# Patient Record
Sex: MALE | Race: WHITE | Employment: FULL TIME | URBAN - NONMETROPOLITAN AREA
[De-identification: names, ages, dates, MRNs, and addresses within clinical notes are randomized per-mention and may not be internally consistent; named-entity substitution may affect disease eponyms.]

---

## 2020-04-08 RX ORDER — METOPROLOL TARTRATE 50 MG/1
50 TABLET, FILM COATED ORAL 2 TIMES DAILY
COMMUNITY

## 2020-04-08 RX ORDER — LISINOPRIL 20 MG/1
20 TABLET ORAL DAILY
COMMUNITY

## 2020-04-09 ENCOUNTER — OFFICE VISIT (OUTPATIENT)
Dept: NEUROLOGY | Age: 55
End: 2020-04-09
Payer: COMMERCIAL

## 2020-04-09 VITALS
HEIGHT: 71 IN | HEART RATE: 84 BPM | SYSTOLIC BLOOD PRESSURE: 150 MMHG | WEIGHT: 232 LBS | BODY MASS INDEX: 32.48 KG/M2 | DIASTOLIC BLOOD PRESSURE: 80 MMHG | RESPIRATION RATE: 16 BRPM

## 2020-04-09 PROBLEM — R26.89 BALANCE PROBLEM: Status: ACTIVE | Noted: 2020-04-09

## 2020-04-09 PROBLEM — F10.10 ALCOHOL ABUSE: Status: ACTIVE | Noted: 2020-04-09

## 2020-04-09 PROBLEM — I10 ESSENTIAL HYPERTENSION: Status: ACTIVE | Noted: 2020-04-09

## 2020-04-09 PROBLEM — R53.1 WEAKNESS: Status: ACTIVE | Noted: 2020-04-09

## 2020-04-09 PROBLEM — F17.200 SMOKER: Status: ACTIVE | Noted: 2020-04-09

## 2020-04-09 PROBLEM — H57.9 VISUAL SYMPTOMS: Status: ACTIVE | Noted: 2020-04-09

## 2020-04-09 PROBLEM — I67.82 CEREBRAL ISCHEMIA: Status: ACTIVE | Noted: 2020-04-09

## 2020-04-09 PROBLEM — I63.9 CEREBELLAR INFARCTION (HCC): Status: ACTIVE | Noted: 2020-04-09

## 2020-04-09 PROBLEM — H53.9 VISUAL CHANGES: Status: ACTIVE | Noted: 2020-04-09

## 2020-04-09 PROBLEM — I25.2 OLD MI (MYOCARDIAL INFARCTION): Status: ACTIVE | Noted: 2020-04-09

## 2020-04-09 PROBLEM — R26.9 GAIT ABNORMALITY: Status: ACTIVE | Noted: 2020-04-09

## 2020-04-09 PROBLEM — R42 DIZZINESS: Status: ACTIVE | Noted: 2020-04-09

## 2020-04-09 PROBLEM — Z91.81 H/O FALL: Status: ACTIVE | Noted: 2020-04-09

## 2020-04-09 PROCEDURE — G8417 CALC BMI ABV UP PARAM F/U: HCPCS | Performed by: PSYCHIATRY & NEUROLOGY

## 2020-04-09 PROCEDURE — G8427 DOCREV CUR MEDS BY ELIG CLIN: HCPCS | Performed by: PSYCHIATRY & NEUROLOGY

## 2020-04-09 PROCEDURE — 99245 OFF/OP CONSLTJ NEW/EST HI 55: CPT | Performed by: PSYCHIATRY & NEUROLOGY

## 2020-04-09 RX ORDER — CLOPIDOGREL BISULFATE 75 MG/1
75 TABLET ORAL DAILY
Qty: 30 TABLET | Refills: 2 | Status: SHIPPED | OUTPATIENT
Start: 2020-04-09 | End: 2020-05-21 | Stop reason: SDUPTHER

## 2020-04-09 RX ORDER — LANOLIN ALCOHOL/MO/W.PET/CERES
500 CREAM (GRAM) TOPICAL NIGHTLY
COMMUNITY

## 2020-04-09 ASSESSMENT — ENCOUNTER SYMPTOMS
WHEEZING: 0
ABDOMINAL PAIN: 0
COLOR CHANGE: 0
SORE THROAT: 0
ABDOMINAL DISTENTION: 0
VISUAL CHANGE: 1
BLOOD IN STOOL: 0
EYE DISCHARGE: 0
CONSTIPATION: 0
NAUSEA: 0
BACK PAIN: 0
VOICE CHANGE: 0
APNEA: 0
EYE ITCHING: 0
TROUBLE SWALLOWING: 0
SWOLLEN GLANDS: 0
CHANGE IN BOWEL HABIT: 0
CHOKING: 0
EYE PAIN: 0
FACIAL SWELLING: 0
VOMITING: 0
SHORTNESS OF BREATH: 0
COUGH: 0
EYE REDNESS: 0
CHEST TIGHTNESS: 0
PHOTOPHOBIA: 0
DIARRHEA: 0
SINUS PRESSURE: 0

## 2020-04-09 NOTE — PROGRESS NOTES
Also   Additional   Symptoms   Present    As  Documented    In   The   detailed                  Review  Of  Systems   And    Please   Refer   To    Them for   Additional    Information. Any components  That are either  Unobtainable  Or  Limited  In   HPI, ROS  And/or PFSH   Are                   Due   ToPatient's  Medical  Problems,  Clinical  Condition   and/or lack of                                other    Alternate   resources. RECORDS   REVIEWED:    historical medical records       INFORMATION   REVIEWED:     MEDICAL   HISTORY,SURGICAL   HISTORY,     MEDICATIONS   LIST,   ALLERGIES AND  DRUG  INTOLERANCES,       FAMILY   HISTORY,  SOCIAL  HISTORY,      PROBLEM  LIST   FOR  PATIENT  CARE   COORDINATION      Past Medical History:   Diagnosis Date    Chronic cough     Dizziness     Fatigue     Heartburn     Vision changes          History reviewed. No pertinent surgical history. Current Outpatient Medications   Medication Sig Dispense Refill    niacin (SLO-NIACIN) 500 MG extended release tablet Take 500 mg by mouth nightly      clopidogrel (PLAVIX) 75 MG tablet Take 1 tablet by mouth daily 30 tablet 2    aspirin 81 MG tablet Take 81 mg by mouth daily      lisinopril (PRINIVIL;ZESTRIL) 20 MG tablet Take 20 mg by mouth daily      metoprolol tartrate (LOPRESSOR) 50 MG tablet Take 50 mg by mouth 2 times daily       No current facility-administered medications for this visit. Allergies   Allergen Reactions    Penicillins          History reviewed. No pertinent family history.       Social History     Socioeconomic History    Marital status:      Spouse name: Not on file    Number of children: Not on file    Years of education: Not on file    Highest education level: Not on file   Occupational History    Not on file   Social Needs    Financial resource strain: Not on file    Food insecurity     Worry: Not on file     Inability: Not on file   Ortega Coloration: Skin is not pale. Findings: No erythema or rash. Nails: There is no clubbing. Psychiatric:         Attention and Perception: He is attentive. Mood and Affect: Mood is not anxious or depressed. Affect is not labile, blunt or inappropriate. Speech: He is communicative. Speech is not rapid and pressured, delayed, slurred or tangential.         Behavior: Behavior is not agitated, slowed, aggressive, withdrawn, hyperactive or combative. Behavior is cooperative. Thought Content: Thought content is not paranoid or delusional. Thought content does not include homicidal or suicidal ideation. Thought content does not include homicidal or suicidal plan. Cognition and Memory: Memory is not impaired. He does not exhibit impaired recent memory or impaired remote memory. Judgment: Judgment is not impulsive or inappropriate. NEUROLOGICALEXAMINATION :      A) MENTAL STATUS:                   Alert and  oriented  To time, place  And  Person. No Aphasia. No  Dysarthria. Able   To  Follow     SIMPLE   commands   without   Any  Difficulty. No right  To left confusion. Normal  Speech  And language function. Insight and  Judgment ,Fund  Of  Knowledge   within normal limits                Recent  And  Remote memory  within   normal limits                Attention &  Concentration are within   normal limits                                                 B) CRANIAL NERVES :      CN : Visual  Acuity  And  Visual fields  within normal limits               Fundi  Could  Not  Be  Could  Not  Be  Evaluated. 3,4,6 CN : Both  Pupils are   Reactive and  Equal.  Movements  Are  Intact. No  Nystagmus. No  THEODORE. No  Afferent  Pupillary  Defect noted.           5 CN :  Normal  Facial sensations and Corneal  Reflexes           7 CN:  Normal  Facial Symmetry  And  Strength. No facial  Weakness. 8 CN :  Hearing  Appears within normal limits          9, 10 CN: Normal   spontaneous, reflex   palate   movements         11 CN:   Normal  Shoulder  shrug and  strength         12 CN :   Normal  Tongue movements and  Tongue  In midline                        No tongue   Fasciculations or atrophy       C) MOTOR  EXAM:                 Strength  In upper  And  Lower   extremities   within   normal limits               No  Drift. No  Atrophy               Rapid   alternating  And  repetitions  Movements  within   normal limits               Muscle  Tone  In upper  And  Lower  Extremities  normal                No rigidity. No  Spasticity. Bradykinesia   absent               No  Asterixis. Sustention  Tremor , Resting   Tremor   absent                    No   other  Abnormal  Movements noted           D) SENSORY :               Light   touch, pinprick,   position  And  Vibration  within normal limits        E) REFLEXES:                   Deep  Tendon  Reflexes   normal                  No  pathological  Reflexes  Bilaterally. F) COORDINATION  AND  GAIT :                                Station and  Gait     SLOW  UNSTEADY                             Romberg 's test    POSITIVE                            Ataxia     POSITIVE   ON  LEFT  SIDE             ASSESSMENT:    Patient Active Problem List   Diagnosis    Gait abnormality    Weakness    Cerebral ischemia    Visual changes    H/O fall    Cerebellar infarction ([address]    Balance problem    Dizziness    Visual symptoms    Smoker    Alcohol abuse    Essential hypertension    Old MI (myocardial infarction)         VISITING DIAGNOSIS:      ICD-10-CM    1. Cerebellar infarction ([address] I63.9    2. Gait abnormality R26.9 External Referral To Physical Therapy     VL DUP CAROTID BILATERAL     US Transcranial Doppler Complete   3.  Weakness R53.1 External Referral To Physical Therapy     VL DUP CAROTID BILATERAL     US Transcranial Doppler Complete   4. Cerebral ischemia I67.82 VL DUP CAROTID BILATERAL     US Transcranial Doppler Complete   5. Visual changes H53.9 Denise Coreas MD, Ophthalmology, [place]     VL DUP CAROTID BILATERAL     US Transcranial Doppler Complete   6. H/O fall Z91.81    7. Balance problem R26.89    8. Dizziness R42    9. Visual symptoms H57.9    10. Smoker F17.200    11. Alcohol abuse F10.10    12. Essential hypertension I10    13. Old MI (myocardial infarction) I25.2               CONCERNS   &   INCREASED   RISK   FOR         * TIA,  CEREBRO  VASCULAR  ISCHEMIA,STROKE     *   DIZZINESS,   VERTEBROBASILAR  INSUFFICIENCY ,        *  GAIT  DIFFICULTIES  &   BALANCE PROBLMES   AND  FALL                VARIOUS  RISK   FACTORS   WERE  REVIEWED   AND   DISCUSSED. *  PATIENT   HAS  MULTIPLE   MEDICAL, NEUROLOGICAL   PROBLEMS . PATIENT'S   MANAGEMENT  IS  CHALLENGING. PLAN:                         Vickie Collier  Of  The  Diagnoses,  The  Management & Treatment  Options            AND    Care  plan  Were          Reviewed and   Discussed   With  patient. * Goals  And  Expectations  Of  The  Therapy  Discussed   And  Reviewed. *   Benefits   And   Side  Effect  Profile  Of  Medication/s   Were   Discussed                * Need   For  Further   Follow up For  The  Various  Problems Were  discussed. * Results  Of  The  Previous  Diagnostic tests were reviewed and  discussed                 Medical  Decision  Making  Was  Made  Based on the   Complexity  Of  Above  Mentioned  Diagnoses,    Data reviewed             And    Risk  Of  Significant   Co morbidities and   complicating   Factors.                  Medical  Decision  Was   High    Complexity   Due   To  The  Patient's  Multiple  Symptoms,  Advancing   Disease,  Complex  Treatment  Regimen,  Multiple medications           and   Risk  Of boost your chance of quitting tobacco for good.  Use nicotine gum, patches, or lozenges.  Ask your doctor about stop-smoking programs and medicines.  Keep trying.  In addition to reducing your risk of diseases in the future, you will notice some benefits soon after you stop using tobacco. If you have shortness of breath or asthma symptoms, they will likely getbetter within a few weeks after you quit.  Limit how much alcohol you drink. Moderate amounts of alcohol (up to 2 drinks a day for men, 1drink a day for women) are okay. But drinking too much can lead to liver problems, high blood pressure, and other health problems.  health   If you have a family, there are many things you can do together to improve your health.  Eat meals together as a family as often as possible.  Eat healthy foods. This includes fruits, vegetables, lean meats and dairy, and whole grains.  Include your family in your fitness plan. Most peoplethink of activities such as jogging or tennis as the way to fitness, but there are many ways you and your family can be more active. Anything that makes you breathe hard and gets your heart pumping is exercise. Here are sometips:   Walk to do errands or to take your child to school or the bus.  Go for a family bike ride after dinner instead of watching TV.  Where can you learn more?  Go toAmerican [facility] [email or web address] [email or web address]. org and sign in to your [employer] account. Enter Z609 in the Search HealthInformation box to learn more about \"A Healthy Lifestyle: Care Instructions. \"     If you do not have anaccount, please click on the \"Sign Up Now\" link.  Current as of: July 26, 2016   Content Version: 11.2   © 7057-9991 [employer]. Care instructions adapted under license by [facility] ([facility]).  If you have questions about a medical condition or this instruction, always ask your healthcare professional. [employer] disclaims any warranty or liability for your use of

## 2020-04-13 ENCOUNTER — HOSPITAL ENCOUNTER (OUTPATIENT)
Dept: INTERVENTIONAL RADIOLOGY/VASCULAR | Age: 55
Discharge: HOME | End: 2020-04-15
Payer: COMMERCIAL

## 2020-04-13 PROCEDURE — 93880 EXTRACRANIAL BILAT STUDY: CPT

## 2020-05-04 ENCOUNTER — TELEPHONE (OUTPATIENT)
Dept: NEUROLOGY | Age: 55
End: 2020-05-04

## 2020-05-05 NOTE — TELEPHONE ENCOUNTER
Scott's TCD and follow up scheduled sooner. [facility]

## 2020-05-06 ENCOUNTER — HOSPITAL ENCOUNTER (OUTPATIENT)
Dept: NEUROLOGY | Age: 55
Discharge: HOME | End: 2020-05-06
Payer: COMMERCIAL

## 2020-05-06 PROCEDURE — 93892 TCD EMBOLI DETECT W/O INJ: CPT

## 2020-05-06 PROCEDURE — 93886 INTRACRANIAL COMPLETE STUDY: CPT

## 2020-05-06 NOTE — PROGRESS NOTES
TCD Completed with Emboli Detection. PCP: No primary care provider on file. Ordering: Antonieta Chavez Neurologist    Interpreting Physician: Marcy Molina    Electronically signed by Velia Everett RN on 5/6/2020 at 8:50 AM

## 2020-05-07 NOTE — PROCEDURES
intensity  transient signals) and no embolic shower events were detected. IMPRESSION:      1. No significant focal stenosis or flow abnormalities noted in the    anterior circulation. 2.  There is mild low mean flow velocities in the vertebrobasilar    system. No flow reverse is noted. 3.  TCD of intracranial arteries for emboli detection is negative. Further clinical correlation and followup recommended.           Shira Taylor MD  Board Certified Neurologist    D: 05/07/2020 12:16:38       T: 05/07/2020 12:39:07     [id number]  Job#: [id number]     Doc#: [id number]

## 2020-05-21 ENCOUNTER — OFFICE VISIT (OUTPATIENT)
Dept: NEUROLOGY | Age: 55
End: 2020-05-21
Payer: COMMERCIAL

## 2020-05-21 VITALS
BODY MASS INDEX: 32.9 KG/M2 | WEIGHT: 235 LBS | SYSTOLIC BLOOD PRESSURE: 138 MMHG | HEART RATE: 64 BPM | RESPIRATION RATE: 14 BRPM | DIASTOLIC BLOOD PRESSURE: 76 MMHG | HEIGHT: 71 IN

## 2020-05-21 PROCEDURE — G8427 DOCREV CUR MEDS BY ELIG CLIN: HCPCS | Performed by: PSYCHIATRY & NEUROLOGY

## 2020-05-21 PROCEDURE — 99214 OFFICE O/P EST MOD 30 MIN: CPT | Performed by: PSYCHIATRY & NEUROLOGY

## 2020-05-21 PROCEDURE — 4004F PT TOBACCO SCREEN RCVD TLK: CPT | Performed by: PSYCHIATRY & NEUROLOGY

## 2020-05-21 PROCEDURE — G8417 CALC BMI ABV UP PARAM F/U: HCPCS | Performed by: PSYCHIATRY & NEUROLOGY

## 2020-05-21 PROCEDURE — 3017F COLORECTAL CA SCREEN DOC REV: CPT | Performed by: PSYCHIATRY & NEUROLOGY

## 2020-05-21 RX ORDER — CLOPIDOGREL BISULFATE 75 MG/1
75 TABLET ORAL DAILY
Qty: 30 TABLET | Refills: 5 | Status: SHIPPED | OUTPATIENT
Start: 2020-05-21 | End: 2020-08-27 | Stop reason: SDUPTHER

## 2020-05-21 ASSESSMENT — ENCOUNTER SYMPTOMS
NAUSEA: 0
VISUAL CHANGE: 1
CHOKING: 0
EYE REDNESS: 0
VOMITING: 0
SHORTNESS OF BREATH: 0
PHOTOPHOBIA: 0
DIARRHEA: 0
CHEST TIGHTNESS: 0
TROUBLE SWALLOWING: 0
BACK PAIN: 0
SORE THROAT: 0
COUGH: 0
VOICE CHANGE: 0
CHANGE IN BOWEL HABIT: 0
APNEA: 0
COLOR CHANGE: 0
ABDOMINAL DISTENTION: 0
FACIAL SWELLING: 0
SWOLLEN GLANDS: 0
CONSTIPATION: 0
WHEEZING: 0
SINUS PRESSURE: 0
BLOOD IN STOOL: 0
EYE PAIN: 0
EYE DISCHARGE: 0
EYE ITCHING: 0
ABDOMINAL PAIN: 0

## 2020-05-21 NOTE — PROGRESS NOTES
Beto Correa                                          IN       April 01, 2020                                3)      MRI   BRAIN        SHOWED       LEFT   CEREBELLAR  INFARCTS                               4)    MULTIPLE  CO  MORBID  MEDICAL  CONDITIONS                                   INCLUDING     HYPERTENSION,                                         OLD  MYOCARDIAL INFARCTION                                                         -     TO   FOLLOW  WITH   HIS  PCP                           5)                   H/O     CHRONIC  SMOKING                    PATIENT  AWARE  OF  RISKS  AND                 SIDE  EFFECTS  OF   SMOKING   DISCUSSED. PATIENT   ADVISED   AND  COUNSELED    TO   QUIT  SMOKING. 6)     H/O  CHRONIC   DAILY  ALCOHOL  USAGE. PATIENT     WORKS  IN  FACTORY                       7)    PATIENT  AT  INCREASED   RISK  FOR                             RECURRENCE OF   TIA,  CEREBRAL ISCHEMIA,   STROKE                              FALLS                               -   VARIOUS  RISK  FACTORS    REVIEWED   AND                                       DISCUSSED  WITH PATIENT                    8)      PATIENT'S   LEFT  SIDED   WEAKNESS  RESOLVED                           LEFT  SIDED  INCOORDINATION,   ATAXIA    BALANCE                               PROBLEMS      IMPROVED   BETWEEN    80  %                                  DIPLOPIA    -       RESOLVED                                                      9)     PATIENT      HAD     NEURO  DIAGNOSTIC  EVALUATIONS                              CAROTID   DOPPLER      AND     TCD       SHOWED                                          NO  SIGNIFICANT  ABNORMALITIES                                               10)          PATIENT    STARTED  ON      PLAVIX  IN  April 2020                                PATIENT     TOLERATING  THE  SAME  AND  GETTING  BENEFIT. ultrasound with no   hemodynamically significant stenosis. VISITING DIAGNOSIS:      ICD-10-CM    1. Cerebellar infarction ([address] I63.9    2. Cerebral ischemia I67.82    3. Dizziness R42    4. H/O fall Z91.81    5. Old MI (myocardial infarction) I25.2    6. Visual symptoms H57.9    7. Smoker F17.200    8. Visual changes H53.9    9. Weakness R53.1    10. Gait abnormality R26.9    11. Balance problem R26.89    12. Essential hypertension I10    13. Alcohol abuse F10.10               CONCERNS   &   INCREASED   RISK   FOR         * TIA,  CEREBRO  VASCULAR  ISCHEMIA,STROKE     *   DIZZINESS,   VERTEBROBASILAR  INSUFFICIENCY ,        *  GAIT  DIFFICULTIES  &   BALANCE PROBLMES   AND  FALL                VARIOUS  RISK   FACTORS   WERE  REVIEWED   AND   DISCUSSED. *  PATIENT   HAS  MULTIPLE   MEDICAL, NEUROLOGICAL   PROBLEMS . PATIENT'S   MANAGEMENT  IS  CHALLENGING. PLAN:                         Asael Geronimo  Of  The  Diagnoses,  The  Management & Treatment  Options            AND    Care  plan  Were          Reviewed and   Discussed   With  patient. * Goals  And  Expectations  Of  The  Therapy  Discussed   And  Reviewed. *   Benefits   And   Side  Effect  Profile  Of  Medication/s   Were   Discussed                * Need   For  Further   Follow up For  The  Various  Problems Were  discussed. * Results  Of  The  Previous  Diagnostic tests were reviewed and  discussed                 Medical  Decision  Making  Was  Made  Based on the   Complexity  Of  Above  Mentioned  Diagnoses,    Data reviewed             And    Risk  Of  Significant   Co morbidities and   complicating   Factors.                  Medical  Decision  Was   High    Complexity   Due   To  The  Patient's  Multiple  Symptoms,      Advancing   Disease,  Complex  Treatment  Regimen,  Multiple medications           and   Risk  Of   Side  Effects,  Difficulty  In  Medication  Management  And  Diagnostic Challenges       In  View  Of  The  Associated   Co  Morbid  Conditions   And  Problems. * FALL   PRECAUTIONS. THESE  REVIEWED   AND  DISCUSSED      *   ABSOLUTELY   NO  DRIVING      *   BE  CAREFUL  WITH  ACTIVITIES             *   ADEQUATE   FLUIDINTAKE   AND  ELECTROLYTE  BALANCE         * KEEP  DAIRY  OF   THE  NEUROLOGICAL  SYMPTOMS        RECORDING THE    DURATION  AND  FREQUENCY. *  AVOID    CONDITIONS  AND  FACTORS   THAT  MAKE                  NEUROLOGICAL  SYMPTOMS  WORSE.                  *TO  MAINTAIN  REGULAR  SLEEP  WAKE  CYCLES. *   TO  HAVE  ADEQUATE  REST  AND   SLEEP    HOURS.            *    AVOID  ANY USAGE OF    TOBACCO,              EXCESSIVE  ALCOHOL  AND   ILLEGAL   SUBSTANCES            *  CONTINUE   MEDICATIONS    PRESCRIBED   BY NEUROLOGIST    AS    RECOMMENDED       *   Compliance   With  Medications   And  Instructions          *    Antiplatelet  therapy    As   Recommended  Was   Discussed        *    Prophylactic  Use   Of     Vitamin   B   Complex,  Folic  Acid,    Vitamin  B12    Multivitamin,       Calcium  With  magnesium  And  Vit D    Supplementations   Over  The  Counter  Discussed                          *  EVALUATIONS  AND  FOLLOW UP:                 * PHYSICAL  THERAPY                                  *CARDIOLOGY              *   OPTHALMOLOGY                                                       Orders Placed This Encounter   Medications    clopidogrel (PLAVIX) 75 MG tablet     Sig: Take 1 tablet by mouth daily     Dispense:  30 tablet     Refill:  5                         *           PATIENT     STARTED   ON      PLAVIX    IN  April 2020                                PATIENT  TOLERATING  THE  SAME  AND  GETTING  BETTER                                                  EXPECTATIONS,   GOALS   AND  SIDE  EFFECTS  MEDICATION    WERE                                 REVIEWED     AND   DISCUSSED    IN    DETAIL. PATIENT    WANTED   TO  RETURN      TO  WORK      AS  TOLERATED                   12)     PATIENT   RE  COUNSELED  TO                                    A)     AVOID  ALCOHOL  ABUSE                               B)        TO  CONTINUE    PLAVIX   AS  BEFORE. C)       RISK  FACTORS   FOR  STROKE     REVIEWED                                                   AND  DISCUSSED                                   *PATIENT   TO  FOLLOW  UP  WITH   PRIMARY  CARE         OTHER  CONSULTANTS  AS  BEFORE. *  Maintain   Healthy  Life Style    With   Periodic  Monitoring  Of      Any  Medical  Conditions  Including   Elevated  Blood  Pressure,  Lipid  Profile,     Blood  Sugar levels  AndHeart  Disease. *   Period   Screening  For  Cancers  Involving  Breast,  Colon,    lungs  And  Other  Organs  As  Applicable,  In consultation   With  Your  Primary Care Providers. *Second  Neurological  Opinion  And  Evaluations  In  [facility]  Setting  If  Patient  Is  Interested. * Please   Contact   Neurology  Clinic   Early   If   Are  Any  New  Neurological   And  Any neurological  Concerns. *  If  The  Patient remains  Neurologically  Stable   Return   To  [facility] Neurology Department   IN      3        MONTHS  TIME   FOR  FURTHER              FOLLOW UP.                       *   The  Neurological   Findings,  Possible  Diagnosis,  Differential diagnoses                    And  Options  For    Further   Investigations                   And  management   Are  Discussed  Comprehensively. Medications   And  Prescription   Risks  And  Side effects  Are   Also  Discussed.                      *  If   There is  Any  Significant  Worsening   Of  Current  Symptoms  And  Or                  If patient  Develops   Any additional  New  NeurologicalSymptoms                  Or  Significant

## 2020-08-27 ENCOUNTER — OFFICE VISIT (OUTPATIENT)
Dept: NEUROLOGY | Age: 55
End: 2020-08-27
Payer: COMMERCIAL

## 2020-08-27 VITALS
HEART RATE: 78 BPM | OXYGEN SATURATION: 98 % | WEIGHT: 238.9 LBS | BODY MASS INDEX: 33.44 KG/M2 | HEIGHT: 71 IN | DIASTOLIC BLOOD PRESSURE: 90 MMHG | TEMPERATURE: 98.1 F | SYSTOLIC BLOOD PRESSURE: 148 MMHG

## 2020-08-27 PROCEDURE — 99215 OFFICE O/P EST HI 40 MIN: CPT | Performed by: PSYCHIATRY & NEUROLOGY

## 2020-08-27 PROCEDURE — G8427 DOCREV CUR MEDS BY ELIG CLIN: HCPCS | Performed by: PSYCHIATRY & NEUROLOGY

## 2020-08-27 PROCEDURE — 4004F PT TOBACCO SCREEN RCVD TLK: CPT | Performed by: PSYCHIATRY & NEUROLOGY

## 2020-08-27 PROCEDURE — G8417 CALC BMI ABV UP PARAM F/U: HCPCS | Performed by: PSYCHIATRY & NEUROLOGY

## 2020-08-27 PROCEDURE — 3017F COLORECTAL CA SCREEN DOC REV: CPT | Performed by: PSYCHIATRY & NEUROLOGY

## 2020-08-27 RX ORDER — CLOPIDOGREL BISULFATE 75 MG/1
75 TABLET ORAL DAILY
Qty: 90 TABLET | Refills: 1 | Status: SHIPPED | OUTPATIENT
Start: 2020-08-27 | End: 2021-03-04 | Stop reason: SDUPTHER

## 2020-08-27 ASSESSMENT — ENCOUNTER SYMPTOMS
VOICE CHANGE: 0
CHEST TIGHTNESS: 0
BACK PAIN: 0
NAUSEA: 0
EYE ITCHING: 0
TROUBLE SWALLOWING: 0
SWOLLEN GLANDS: 0
VOMITING: 0
CONSTIPATION: 0
EYE REDNESS: 0
CHANGE IN BOWEL HABIT: 0
ABDOMINAL PAIN: 0
APNEA: 0
SORE THROAT: 0
SINUS PRESSURE: 0
EYE PAIN: 0
EYE DISCHARGE: 0
PHOTOPHOBIA: 0
COUGH: 0
COLOR CHANGE: 0
WHEEZING: 0
SHORTNESS OF BREATH: 0
VISUAL CHANGE: 1
CHOKING: 0
ABDOMINAL DISTENTION: 0
FACIAL SWELLING: 0
BLOOD IN STOOL: 0
DIARRHEA: 0

## 2020-08-27 NOTE — PATIENT INSTRUCTIONS
[facility] NEUROLOGY    Due to the complex nature of our neurological testing, It is the policy of the Neurology Department not to release the results of your testing over the phone. Once all testing is completed and we have all the results back, Dr. Otilia Rodriguez will then personally go over all the results with you and answer any questions that you may have during a follow up appointment. If you are unable to keep this appointment, please notify the [address] [place] [address] @ [phone], as soon as possible. Please bring your prescription bottles to all appointments. * FALL   PRECAUTIONS. *   ADEQUATE   FLUID  INTAKE   AND  ELECTROLYTE  BALANCE           * KEEP  DAIRY  OF   THE  NEUROLOGICAL  SYMPTOMS          *  TO  MAINTAIN  REGULAR  SLEEP  WAKE  CYCLES. *   TO  HAVE  ADEQUATE  REST  AND   SLEEP    HOURS.        *    AVOID  USAGE OF   TOBACCO,  EXCESSIVE  ALCOHOL                AND   ILLEGAL   SUBSTANCES,  IF  ANY          *  Maintain   Healthy  Life Style    With   Periodic  Monitoring  Of      Any  Medical  Conditions  Including   Elevated  Blood  Pressure,  Lipid  Profile,     Blood  Sugar levels  And   Heart  Disease. *   Period   Screening  For  Cancers  Involving  Breast,  Colon,   lungs  And  Other  Organs  As  Applicable,  In consultation   With  Your  Primary Care Providers. *  If   There is  Any  Significant  Worsening   Of  Current  Symptoms  And  Or  If    Any additional  New  Neurological  Symptoms                 Or  Significant  Concerns   Should  Call  911 or  Go  To  Emergency  Department  For  Further  Immediate  Evaluation.

## 2020-08-27 NOTE — PROGRESS NOTES
[facility], [place]  Neurology  [address]MNN:[phone]   Fax: [phone]    SUBJECTIVE:     PATIENT ID:  Quinn Alvarez is a  RIGHT   HANDED 63 y.o. male. Dizziness   This is a recurrent problem. Episode onset: [facility]   2020. The problem has been waxing and waning. Associated symptoms include vertigo and a visual change. Pertinent negatives include no abdominal pain, anorexia, arthralgias, change in bowel habit, chest pain, chills, congestion, coughing, diaphoresis, fatigue, fever, headaches, joint swelling, myalgias, nausea, neck pain, numbness, rash, sore throat, swollen glands, urinary symptoms, vomiting or weakness. The symptoms are aggravated by stress and standing. He has tried sleep, rest and lying down (ANTI PLATELET  THERAPY) for the symptoms. The treatment provided mild relief. Cerebrovascular Accident   This is a new problem. Episode onset: [facility]  2020. The problem has been rapidly improving. Associated symptoms include vertigo and a visual change. Pertinent negatives include no abdominal pain, anorexia, arthralgias, change in bowel habit, chest pain, chills, congestion, coughing, diaphoresis, fatigue, fever, headaches, joint swelling, myalgias, nausea, neck pain, numbness, rash, sore throat, swollen glands, urinary symptoms, vomiting or weakness. Nothing aggravates the symptoms. Treatments tried: ANTI PLATELET  THERAPY. The treatment provided mild relief. History obtained from  The patient         and other  available   medical  records   were  Also  reviewed. The  Duration,  Quality,  Severity,  Location,  Timing,  Context,  Modifying  Factors   Of   The   Chief   Complaint   And  Present  Illness       Was   Reviewed   In   Chronological   Manner.                                             PATIENT'S  MAIN  CONCERNS INCLUDE :                       1)      H/O      ACUTE  STROKE  IN  THE [facility],  2020 WITH    LEFT  SIDE   WEAKNESS,  INCOORDINATION,                                      DIZZINESS,    DIPLOPIA      BALANCE  PROBLEMS   AND  FALLS                                             -  IMPROVING                              2)     HAD   EVALUATIONS       AT    [address] [facility]    AT  [facility]                                          IN       April 01, 2020                                3)      MRI   BRAIN        SHOWED       LEFT   CEREBELLAR  INFARCTS                               4)    MULTIPLE  CO  MORBID  MEDICAL  CONDITIONS                                   INCLUDING     HYPERTENSION,                                         OLD  MYOCARDIAL INFARCTION                                                         -     TO   FOLLOW  WITH   HIS  PCP                           5)                   H/O     CHRONIC  SMOKING                    PATIENT  AWARE  OF  RISKS  AND                 SIDE  EFFECTS  OF   SMOKING   DISCUSSED. PATIENT   ADVISED   AND  COUNSELED    TO   QUIT  SMOKING. 6)     H/O  CHRONIC   DAILY  ALCOHOL  USAGE.                              PATIENT     WORKS  IN  FACTORY                       7)    PATIENT  AT  INCREASED   RISK  FOR                             RECURRENCE OF   TIA,  CEREBRAL ISCHEMIA,   STROKE                              FALLS                               -   VARIOUS  RISK  FACTORS    REVIEWED   AND                                       DISCUSSED  WITH PATIENT                    8)      PATIENT'S   LEFT  SIDED   WEAKNESS  RESOLVED                           LEFT  SIDED  INCOORDINATION,   ATAXIA    BALANCE                               PROBLEMS       AND   DIPLOPIA                              -      IMPROVED     ABOUT     80  %                                                                                                        9)     PATIENT      HAD     NEURO  DIAGNOSTIC  EVALUATIONS                              CAROTID   DOPPLER      AND absent           Memory problemsabsent              Confusion        absent            Paresthesia   numbness          absent           Seizures  And  Starring  Episodes           absent           Syncope,  Near  syncopal episodes         absent           Speech   problems           absent             Swallowing   Problems      absent            Dizziness,  Light headedness           present              Vertigo        absent             Generalized   Weakness    absent              focal  Weakness     absent             Tremors         absent              Sleep  Problems     absent             History  Of   Recent  Head  Injury     absent             History  Of   Recent  TIA     absent             History  Of   Recent    Stroke     present             Neck  Pain   and   Neck muscle  Spasms  absent               Radiating  down   And   Weakness           absent            Lower back   Pain  And     Spasms  absent              Radiating    Down   And   Weakness          absent                H/OFALLS        present               History  Of   Visual  Symptoms    present                  Associated   Diplopia       present                                               Also   Additional   Symptoms   Present    As  Documented    In   The   detailed                  Review  Of  Systems   And    Please   Refer   To    Them for   Additional    Information. Any components  That are either  Unobtainable  Or  Limited  In   HPI, ROS  And/or PFSH   Are                   Due   ToPatient's  Medical  Problems,  Clinical  Condition   and/or lack of                                other    Alternate   resources.           RECORDS   REVIEWED:    historical medical records       INFORMATION   REVIEWED:     MEDICAL   HISTORY,SURGICAL   HISTORY,     MEDICATIONS   LIST,   ALLERGIES AND  DRUG  INTOLERANCES,       FAMILY   HISTORY,  SOCIAL  HISTORY,      PROBLEM  LIST   FOR  PATIENT  CARE   COORDINATION      Past Medical status: Not on file    Intimate partner violence     Fear of current or ex partner: Not on file     Emotionally abused: Not on file     Physically abused: Not on file     Forced sexual activity: Not on file   Other Topics Concern    Not on file   Social History Narrative    Not on file       Vitals:    08/27/20 1030   BP: (!) 160/96   Pulse: 78   Temp: 98.1 °F (36.7 °C)   SpO2: 98%         Wt Readings from Last 3 Encounters:   08/27/20 238 lb 14.4 oz (108.4 kg)   05/21/20 235 lb (106.6 kg)   04/09/20 232 lb (105.2 kg)         BP Readings from Last 3 Encounters:   08/27/20 (!) 160/96   05/21/20 138/76   04/09/20 (!) 150/80         Review of Systems   Constitutional: Negative for appetite change, chills, diaphoresis, fatigue, fever and unexpected weight change. HENT: Negative for congestion, dental problem, drooling, ear discharge, ear pain, facial swelling, hearing loss, mouth sores, nosebleeds, postnasal drip, sinus pressure, sore throat, tinnitus, trouble swallowing and voice change. Eyes: Negative for photophobia, pain, discharge, redness, itching and visual disturbance. Respiratory: Negative for apnea, cough, choking, chest tightness, shortness of breath and wheezing. Cardiovascular: Negative for chest pain, palpitations and leg swelling. Gastrointestinal: Negative for abdominal distention, abdominal pain, anorexia, blood in stool, change in bowel habit, constipation, diarrhea, nausea and vomiting. Endocrine: Negative for cold intolerance, heat intolerance, polydipsia, polyphagia and polyuria. Musculoskeletal: Positive for gait problem. Negative for arthralgias, back pain, joint swelling, myalgias, neck pain and neck stiffness. Skin: Negative for color change, pallor, rash and wound. Allergic/Immunologic: Negative for environmental allergies, food allergies and immunocompromised state. Neurological: Positive for dizziness, vertigo and light-headedness.  Negative for tremors, seizures, syncope, facial asymmetry, speech difficulty, weakness, numbness and headaches. Hematological: Negative for adenopathy. Does not bruise/bleed easily. Psychiatric/Behavioral: Negative for agitation, behavioral problems, confusion, decreased concentration, dysphoric mood, hallucinations, self-injury, sleep disturbance and suicidal ideas. The patient is not nervous/anxious and is not hyperactive. OBJECTIVE:    Physical Exam  Constitutional:       Appearance: He is well-developed. HENT:      Head: Normocephalic and atraumatic. No raccoon eyes or Boudreaux's sign. Right Ear: External ear normal.      Left Ear: External ear normal.      Nose: Nose normal.   Eyes:      Conjunctiva/sclera: Conjunctivae normal.      Pupils: Pupils are equal, round, and reactive to light. Neck:      Musculoskeletal: Normal range of motion and neck supple. Normal range of motion. No neck rigidity or muscular tenderness. Thyroid: No thyroid mass or thyromegaly. Vascular: No carotid bruit. Trachea: No tracheal deviation. Meningeal: Brudzinski's sign and Kernig's sign absent. Cardiovascular:      Rate and Rhythm: Normal rate and regular rhythm. Pulmonary:      Effort: Pulmonary effort is normal.   Musculoskeletal:         General: No tenderness. Skin:     General: Skin is warm. Coloration: Skin is not pale. Findings: No erythema or rash. Nails: There is no clubbing. Psychiatric:         Attention and Perception: He is attentive. Mood and Affect: Mood is not anxious or depressed. Affect is not labile, blunt or inappropriate. Speech: He is communicative. Speech is not rapid and pressured, delayed, slurred or tangential.         Behavior: Behavior is not agitated, slowed, aggressive, withdrawn, hyperactive or combative. Behavior is cooperative. Thought Content:  Thought content is not paranoid or delusional. Thought content does not include homicidal or suicidal ideation. Thought content does not include homicidal or suicidal plan. Cognition and Memory: Memory is not impaired. He does not exhibit impaired recent memory or impaired remote memory. Judgment: Judgment is not impulsive or inappropriate. NEUROLOGICALEXAMINATION :      A) MENTAL STATUS:                   Alert and  oriented  To time, place  And  Person. No Aphasia. No  Dysarthria. Able   To  Follow     SIMPLE   commands   without   Any  Difficulty. No right  To left confusion. Normal  Speech  And language function. Insight and  Judgment ,Fund  Of  Knowledge   within normal limits                Recent  And  Remote memory  within   normal limits                Attention &  Concentration are within   normal limits                                                 B) CRANIAL NERVES :      CN : Visual  Acuity  And  Visual fields  within normal limits               Fundi  Could  Not  Be  Could  Not  Be  Evaluated. 3,4,6 CN : Both  Pupils are   Reactive and  Equal.  Movements  Are  Intact. No  Nystagmus. No  THEODORE. No  Afferent  Pupillary  Defect noted. 5 CN :  Normal  Facial sensations and Corneal  Reflexes           7 CN:  Normal  Facial  Symmetry  And  Strength. No facial  Weakness. 8 CN :  Hearing  Appears within normal limits          9, 10 CN: Normal   spontaneous, reflex   palate   movements         11 CN:   Normal  Shoulder  shrug and  strength         12 CN :   Normal  Tongue movements and  Tongue  In midline                        No tongue   Fasciculations or atrophy       C) MOTOR  EXAM:                 Strength  In upper  And  Lower   extremities   within   normal limits               No  Drift.      No  Atrophy               Rapid   alternating  And  repetitions  Movements  within   normal limits               Muscle  Tone  In upper  And Lower  Extremities  normal                No rigidity. No  Spasticity. Bradykinesia   absent               No  Asterixis. Sustention  Tremor , Resting   Tremor   absent                    No   other  Abnormal  Movements noted           D) SENSORY :               Light   touch, pinprick,   position  And  Vibration  within normal limits        E) REFLEXES:                   Deep  Tendon  Reflexes   normal                  No  pathological  Reflexes  Bilaterally.                                   F) COORDINATION  AND  GAIT :                                Station and  Gait     SLOW                             Romberg 's test    POSITIVE                            Ataxia     POSITIVE   ON  LEFT  SIDE             ASSESSMENT:      Patient Active Problem List   Diagnosis    Gait abnormality    Weakness    Cerebral ischemia    Visual changes    H/O fall    Cerebellar infarction ([address]    Balance problem    Dizziness    Visual symptoms    Smoker    Alcohol abuse    Essential hypertension    Old MI (myocardial infarction)         ULTRASOUND EVALUATION OF THE CAROTID ARTERIES       4/13/2020       COMPARISON:   None.       HISTORY:   ORDERING SYSTEM PROVIDED HISTORY: Gait abnormality   TECHNOLOGIST PROVIDED HISTORY:   Reason for Exam: gait abn, visual changes, weakness   Acuity: Acute   Type of Exam: Initial       FINDINGS:       RIGHT:       The right common carotid artery demonstrates peak systolic velocities of 06.5   and 47.6 cm/sec in the proximal and distal segments respectively.       The right internal carotid artery demonstrates the systolic velocities of   79.0, 36.6 and 40.9 cm/sec in the proximal, mid and distal segments   respectively.       The external carotid artery is patent.  The vertebral artery demonstrates   normal antegrade flow.       Mild atherosclerosis proximal ICA with associated 20% luminal narrowing.       ICA/CCA ratio of 0.6.           LEFT:       The left common carotid artery demonstrates peak systolic velocities of 88.2   and 52.6 cm/sec in the proximal and distal segments respectively.       The left internal carotid artery demonstrates the systolic velocities of   92.5, 50.8 and 58.5 cm/sec in the proximal, mid and distal segments   respectively.       The external carotid artery is patent.  The vertebral artery demonstrates   normal antegrade flow.       No evidence of focal atherosclerotic plaque.       ICA/CCA ratio of 0.7.           Impression   Mild atherosclerosis proximal right ICA with associated 20% luminal   narrowing, otherwise negative bilateral carotid ultrasound with no   hemodynamically significant stenosis. VISITING DIAGNOSIS:      ICD-10-CM    1. Cerebellar infarction ([facility] [address]  I63.9    2. Old MI (myocardial infarction)  I25.2    3. Cerebral ischemia  I67.82    4. Weakness  R53.1    5. Smoker  F17.200    6. Balance problem  R26.89    7. Alcohol abuse  F10.10    8. Dizziness  R42    9. Essential hypertension  I10    10. Visual changes  H53.9    11. H/O fall  Z91.81    12. Gait abnormality  R26.9    13. Visual symptoms  H57.9               CONCERNS   &   INCREASED   RISK   FOR         * TIA,  CEREBRO  VASCULAR  ISCHEMIA,STROKE     *   DIZZINESS,   VERTEBROBASILAR  INSUFFICIENCY ,        *  GAIT  DIFFICULTIES  &   BALANCE PROBLMES   AND  FALL                VARIOUS  RISK   FACTORS   WERE  REVIEWED   AND   DISCUSSED. *  PATIENT   HAS  MULTIPLE   MEDICAL, NEUROLOGICAL   PROBLEMS . PATIENT'S   MANAGEMENT  IS  CHALLENGING. PLAN:                         Mirtha Yeager  Of  The  Diagnoses,  The  Management & Treatment  Options            AND    Care  plan  Were          Reviewed and   Discussed   With  patient. * Goals  And  Expectations  Of  The  Therapy  Discussed   And  Reviewed.           *   Benefits   And   Side  Effect  Profile  Of  Medication/s   Were   Discussed                * Need   For  Further   Follow up For The  Various  Problems Were  discussed. * Results  Of  The  Previous  Diagnostic tests were reviewed and  discussed                 Medical  Decision  Making  Was  Made  Based on the   Complexity  Of  Above  Mentioned  Diagnoses,    Data reviewed             And    Risk  Of  Significant   Co morbidities and   complicating   Factors. Medical  Decision  Was   High    Complexity   Due   To  The  Patient's  Multiple  Symptoms,      Advancing   Disease,  Complex  Treatment  Regimen,  Multiple medications           and   Risk  Of   Side  Effects,  Difficulty  In  Medication  Management  And  Diagnostic  Challenges       In  View  Of  The  Associated   Co  Morbid  Conditions   And  Problems. * FALL   PRECAUTIONS. THESE  REVIEWED   AND  DISCUSSED      *   ABSOLUTELY   NO  DRIVING      *   BE  CAREFUL  WITH  ACTIVITIES             *   ADEQUATE   FLUIDINTAKE   AND  ELECTROLYTE  BALANCE         * KEEP  DAIRY  OF   THE  NEUROLOGICAL  SYMPTOMS        RECORDING THE    DURATION  AND  FREQUENCY. *  AVOID    CONDITIONS  AND  FACTORS   THAT  MAKE                  NEUROLOGICAL  SYMPTOMS  WORSE.                  *TO  MAINTAIN  REGULAR  SLEEP  WAKE  CYCLES.      *   TO  HAVE  ADEQUATE  REST  AND   SLEEP    HOURS.            *    AVOID  ANY USAGE OF    TOBACCO,              EXCESSIVE  ALCOHOL  AND   ILLEGAL   SUBSTANCES            *  CONTINUE   MEDICATIONS    PRESCRIBED   BY NEUROLOGIST    AS    RECOMMENDED       *   Compliance   With  Medications   And  Instructions          *    Antiplatelet  therapy    As   Recommended  Was   Discussed        *    Prophylactic  Use   Of     Vitamin   B   Complex,  Folic  Acid,    Vitamin  B12    Multivitamin,       Calcium  With  magnesium  And  Vit D    Supplementations   Over  The  Counter  Discussed                          *  EVALUATIONS  AND  FOLLOW UP:                 * PHYSICAL  THERAPY                                  *CARDIOLOGY Colon,    lungs  And  Other  Organs  As  Applicable,  In consultation   With  Your  Primary Care Providers. *Second  Neurological  Opinion  And  Evaluations  In  [facility]  Setting  If  Patient  Is  Interested. * Please   Contact   Neurology  Clinic   Early   If   Are  Any  New  Neurological   And  Any neurological  Concerns. *  If  The  Patient remains  Neurologically  Stable   Return   To  [facility] Neurology Department   IN      3        MONTHS  TIME   FOR  FURTHER              FOLLOW UP.                       *   The  Neurological   Findings,  Possible  Diagnosis,  Differential diagnoses                    And  Options  For    Further   Investigations                   And  management   Are  Discussed  Comprehensively. Medications   And  Prescription   Risks  And  Side effects  Are   Also  Discussed. *  If   There is  Any  Significant  Worsening   Of  Current  Symptoms  And  Or                  If patient  Develops   Any additional  New  NeurologicalSymptoms                  Or  Significant  Concerns   Should  Call  911 or  Go  To  Emergency  Department  For  Further  Immediate  Evaluation. The   Above  Were  Reviewed  With  patient   and                       questions  Answered  In  Detail. More   Than  50% of face  To face Time   Was  Spent  On  Counseling                    And   Coordination  Of  Care   Of   Patient's  multiple   Neurological  Problems                         And   Comorbid  Medical   Conditions. Electronically signed by Piero Leavitt MD    Board Certified in  Neurology &  In  [facility] of Psychiatry and Neurology ([employer])      DISCLAIMER:   Although every effort was made to ensure the accuracy of this  electronictranscription, some errors in transcription may have occurred.       GENERAL example, you can exercise 3 times a day for 10 or 20 minutes. Moderate exercise is safe for most people, but it is always agood idea to talk to your doctor before starting an exercise program.   Keep moving. Ebonymorgan Asenciois the lawn, work in the garden, or bookletmobile. Take the stairs instead of the elevator at work.  If you smoke, quit. Peoplewho smoke have an increased risk for heart attack, stroke, cancer, and other lung illnesses. Quitting is hard, but there are ways to boost your chance of quitting tobacco for good.  Use nicotine gum, patches, or lozenges.  Ask your doctor about stop-smoking programs and medicines.  Keep trying.  In addition to reducing your risk of diseases in the future, you will notice some benefits soon after you stop using tobacco. If you have shortness of breath or asthma symptoms, they will likely getbetter within a few weeks after you quit.  Limit how much alcohol you drink. Moderate amounts of alcohol (up to 2 drinks a day for men, 1drink a day for women) are okay. But drinking too much can lead to liver problems, high blood pressure, and other health problems.  health   If you have a family, there are many things you can do together to improve your health.  Eat meals together as a family as often as possible.  Eat healthy foods. This includes fruits, vegetables, lean meats and dairy, and whole grains.  Include your family in your fitness plan. Most peoplethink of activities such as jogging or tennis as the way to fitness, but there are many ways you and your family can be more active. Anything that makes you breathe hard and gets your heart pumping is exercise. Here are sometips:   Walk to do errands or to take your child to school or the bus.  Go for a family bike ride after dinner instead of watching TV.  Where can you learn more?  Go totps://chtobieb.[employer]. org and sign in to your [email or web address] account.  Enter U127 in the Search HealthInformation box to learn more about \"A Healthy Lifestyle: Care Instructions. \"     If you do not have anaccount, please click on the \"Sign Up Now\" link.  Current as of: July 26, 2016   Content Version: 11.2   © 6191-3763 [employer]. Care instructions adapted under license by [facility] ([facility]). If you have questions about a medical condition or this instruction, always ask your healthcare professional. [employer] disclaims any warranty or liability for your use of this information.

## 2021-03-04 ENCOUNTER — OFFICE VISIT (OUTPATIENT)
Dept: NEUROLOGY | Age: 56
End: 2021-03-04
Payer: COMMERCIAL

## 2021-03-04 VITALS
OXYGEN SATURATION: 98 % | BODY MASS INDEX: 34.16 KG/M2 | HEART RATE: 66 BPM | HEIGHT: 71 IN | DIASTOLIC BLOOD PRESSURE: 88 MMHG | RESPIRATION RATE: 16 BRPM | WEIGHT: 244 LBS | SYSTOLIC BLOOD PRESSURE: 136 MMHG

## 2021-03-04 DIAGNOSIS — I63.9 CEREBELLAR INFARCTION (HCC): Primary | ICD-10-CM

## 2021-03-04 DIAGNOSIS — H57.9 VISUAL SYMPTOMS: ICD-10-CM

## 2021-03-04 DIAGNOSIS — R53.1 WEAKNESS: ICD-10-CM

## 2021-03-04 DIAGNOSIS — I67.82 CEREBRAL ISCHEMIA: ICD-10-CM

## 2021-03-04 DIAGNOSIS — R42 DIZZINESS: ICD-10-CM

## 2021-03-04 DIAGNOSIS — I25.2 OLD MI (MYOCARDIAL INFARCTION): ICD-10-CM

## 2021-03-04 DIAGNOSIS — I10 ESSENTIAL HYPERTENSION: ICD-10-CM

## 2021-03-04 DIAGNOSIS — H53.9 VISUAL CHANGES: ICD-10-CM

## 2021-03-04 DIAGNOSIS — F10.10 ALCOHOL ABUSE: ICD-10-CM

## 2021-03-04 DIAGNOSIS — Z91.81 H/O FALL: ICD-10-CM

## 2021-03-04 DIAGNOSIS — R26.89 BALANCE PROBLEM: ICD-10-CM

## 2021-03-04 DIAGNOSIS — F17.200 SMOKER: ICD-10-CM

## 2021-03-04 DIAGNOSIS — R26.9 GAIT ABNORMALITY: ICD-10-CM

## 2021-03-04 PROCEDURE — 99215 OFFICE O/P EST HI 40 MIN: CPT | Performed by: PSYCHIATRY & NEUROLOGY

## 2021-03-04 PROCEDURE — G8417 CALC BMI ABV UP PARAM F/U: HCPCS | Performed by: PSYCHIATRY & NEUROLOGY

## 2021-03-04 PROCEDURE — G8484 FLU IMMUNIZE NO ADMIN: HCPCS | Performed by: PSYCHIATRY & NEUROLOGY

## 2021-03-04 PROCEDURE — G8427 DOCREV CUR MEDS BY ELIG CLIN: HCPCS | Performed by: PSYCHIATRY & NEUROLOGY

## 2021-03-04 PROCEDURE — 4004F PT TOBACCO SCREEN RCVD TLK: CPT | Performed by: PSYCHIATRY & NEUROLOGY

## 2021-03-04 PROCEDURE — 3017F COLORECTAL CA SCREEN DOC REV: CPT | Performed by: PSYCHIATRY & NEUROLOGY

## 2021-03-04 RX ORDER — CLOPIDOGREL BISULFATE 75 MG/1
75 TABLET ORAL DAILY
Qty: 90 TABLET | Refills: 1 | Status: SHIPPED | OUTPATIENT
Start: 2021-03-04

## 2021-03-04 RX ORDER — SIMVASTATIN 20 MG
20 TABLET ORAL NIGHTLY
COMMUNITY

## 2021-03-04 ASSESSMENT — ENCOUNTER SYMPTOMS
DIARRHEA: 0
FACIAL SWELLING: 0
COLOR CHANGE: 0
BACK PAIN: 0
EYE PAIN: 0
NAUSEA: 0
SORE THROAT: 0
PHOTOPHOBIA: 0
VISUAL CHANGE: 1
CHANGE IN BOWEL HABIT: 0
TROUBLE SWALLOWING: 0
COUGH: 0
VOMITING: 0
CHOKING: 0
CONSTIPATION: 0
WHEEZING: 0
EYE ITCHING: 0
ABDOMINAL PAIN: 0
SWOLLEN GLANDS: 0
ABDOMINAL DISTENTION: 0
VOICE CHANGE: 0
APNEA: 0
EYE REDNESS: 0
EYE DISCHARGE: 0
BLOOD IN STOOL: 0
SINUS PRESSURE: 0
SHORTNESS OF BREATH: 0
CHEST TIGHTNESS: 0

## 2021-03-04 NOTE — PROGRESS NOTES
[facility]  Neurology  [address] [address]  [phone]   Fax: [phone]    SUBJECTIVE:     PATIENT ID:  Krys Singh is a  RIGHT   HANDED 61 y.o. male. Dizziness  This is a recurrent problem. Episode onset: [facility]   . The problem has been waxing and waning. Associated symptoms include vertigo and a visual change. Pertinent negatives include no abdominal pain, anorexia, arthralgias, change in bowel habit, chest pain, chills, congestion, coughing, diaphoresis, fatigue, fever, headaches, joint swelling, myalgias, nausea, neck pain, numbness, rash, sore throat, swollen glands, urinary symptoms, vomiting or weakness. The symptoms are aggravated by stress and standing. He has tried sleep, rest and lying down (ANTI PLATELET  THERAPY) for the symptoms. The treatment provided significant relief. Cerebrovascular Accident  This is a new problem. Episode onset: [facility]  . The problem has been resolved. Associated symptoms include vertigo and a visual change. Pertinent negatives include no abdominal pain, anorexia, arthralgias, change in bowel habit, chest pain, chills, congestion, coughing, diaphoresis, fatigue, fever, headaches, joint swelling, myalgias, nausea, neck pain, numbness, rash, sore throat, swollen glands, urinary symptoms, vomiting or weakness. Nothing aggravates the symptoms. Treatments tried: ANTI PLATELET  THERAPY. The treatment provided significant relief. History obtained from  The patient         and other  available   medical  records   were  Also  reviewed. The  Duration,  Quality,  Severity,  Location,  Timing,  Context,  Modifying  Factors   Of   The   Chief   Complaint       And  Present  Illness   Was   Reviewed   In   Chronological   Manner.                                               PATIENT'S  MAIN  CONCERNS INCLUDE :                       1)      H/O      ACUTE  STROKE  IN  THE PAVILION AT [facility],   WITH    LEFT  SIDE   WEAKNESS,  INCOORDINATION,                                      DIZZINESS,    DIPLOPIA      BALANCE  PROBLEMS   AND  FALLS                                             -  IMPROVED      SIGNIFICANTLY                               2)     HAD   EVALUATIONS       AT    [address] [facility]    AT  [facility]                                          IN       April 01, 2020                                3)      MRI   BRAIN        SHOWED       LEFT   CEREBELLAR  INFARCTS                               4)    MULTIPLE  CO  MORBID  MEDICAL  CONDITIONS                                   INCLUDING     HYPERTENSION,                                         OLD  MYOCARDIAL INFARCTION                                                         -     TO   FOLLOW  WITH   HIS  PCP                           5)                   H/O     CHRONIC  SMOKING                    PATIENT  AWARE  OF  RISKS  AND                 SIDE  EFFECTS  OF   SMOKING   DISCUSSED. PATIENT   ADVISED   AND  COUNSELED    TO   QUIT  SMOKING. 6)     H/O  CHRONIC   DAILY  ALCOHOL  USAGE.                              PATIENT     WORKS  IN  FACTORY                       7)    PATIENT  AT  INCREASED   RISK  FOR                             RECURRENCE OF   TIA,  CEREBRAL ISCHEMIA,   STROKE                              FALLS                               -   VARIOUS  RISK  FACTORS    REVIEWED   AND                                       DISCUSSED  WITH PATIENT                    8)      PATIENT'S   LEFT  SIDED   WEAKNESS  RESOLVED                           LEFT  SIDED  INCOORDINATION,   ATAXIA    BALANCE                               PROBLEMS       AND   DIPLOPIA                              -      IMPROVED     ABOUT     80  %                                                                                                        9)     PATIENT      HAD     NEURO  DIAGNOSTIC  EVALUATIONS CAROTID   DOPPLER      AND     TCD       SHOWED                                          NO  SIGNIFICANT  ABNORMALITIES                                               10)          PATIENT    STARTED  ON      PLAVIX  IN  April 2020                                PATIENT     TOLERATING  THE  SAME  AND  GETTING  BENEFIT. EXPECTATIONS,   GOALS   AND  SIDE  EFFECTS  MEDICATION    WERE                                 REVIEWED     AND   DISCUSSED    IN    DETAIL. 11)     PATIENT   RE  COUNSELED  TO                                    A)     AVOID   ANY    ALCOHOL  ABUSE     AND     SMOKING                                B)        TO  CONTINUE    PLAVIX   AS  BEFORE.                                     C)       RISK  FACTORS   FOR  STROKE     REVIEWED                                                   AND  DISCUSSED                   12)        PATIENT   RETURNED      TO  WORK      AS  TOLERATED     SINCE  June 2020               13)      PATIENT  DENIES  ANY     RECURRENCE  OF  TIA /    STROKE  LIKE  SYMPTOMS                              VARIOUS   RISK  FACTORS     FOR  TIA  /   STROKE     DISCUSSED                                    AND PATIENT     COUNSELED                               PATIENT  DENIES  ANY  NEW  NEUROLOGICAL   SYMPTOMS                                  PRECIPITATING  FACTORS: including  fever/infection, exertion/relaxation, position change, stress, weather change,                        medications/alcohol, time of day/darkness/light  Are  absent                                                              MODIFYING  FACTORS:  fever/infection, exertion/relaxation, position change, stress, weather change,                        medications/alcohol, time of day/darkness/light  Are  absent Patient   Indicates   The  Presence   And  The  Absence  Of  The  Following    Associated  And   Additional  Neurological    Symptoms:                                Balance  And coordination   problems  present           Gait problems     present            Headaches      absent              Migraines           absent           Memory problemsabsent              Confusion        absent            Paresthesia   numbness          absent           Seizures  And  Starring  Episodes           absent           Syncope,  Near  syncopal episodes         absent           Speech   problems           absent             Swallowing   Problems      absent            Dizziness,  Light headedness           present              Vertigo        absent             Generalized   Weakness    absent              focal  Weakness     absent             Tremors         absent              Sleep  Problems     absent             History  Of   Recent  Head  Injury     absent             History  Of   Recent  TIA     absent             History  Of   Recent    Stroke     present             Neck  Pain   and   Neck muscle  Spasms  absent               Radiating  down   And   Weakness           absent            Lower back   Pain  And     Spasms  absent              Radiating    Down   And   Weakness          absent                H/OFALLS        present               History  Of   Visual  Symptoms    present                  Associated   Diplopia       present                                               Also   Additional   Symptoms   Present    As  Documented    In   The   detailed                  Review  Of  Systems   And    Please   Refer   To    Them for   Additional    Information.                   Any components  That are either  Unobtainable  Or  Limited  In   HPI, ROS  And/or PFSH   Are                   Due   ToPatient's  Medical  Problems,  Clinical  Condition   and/or lack of other    Alternate   resources. RECORDS   REVIEWED:    historical medical records       INFORMATION   REVIEWED:     MEDICAL   HISTORY,SURGICAL   HISTORY,     MEDICATIONS   LIST,   ALLERGIES AND  DRUG  INTOLERANCES,       FAMILY   HISTORY,  SOCIAL  HISTORY,      PROBLEM  LIST   FOR  PATIENT  CARE   COORDINATION      Past Medical History:   Diagnosis Date    Chronic cough     Dizziness     Fatigue     Heartburn     Vision changes          History reviewed. No pertinent surgical history. Current Outpatient Medications   Medication Sig Dispense Refill    simvastatin (ZOCOR) 20 MG tablet Take 20 mg by mouth nightly      clopidogrel (PLAVIX) 75 MG tablet Take 1 tablet by mouth daily 90 tablet 1    aspirin 81 MG tablet Take 81 mg by mouth daily      lisinopril (PRINIVIL;ZESTRIL) 20 MG tablet Take 20 mg by mouth daily      metoprolol tartrate (LOPRESSOR) 50 MG tablet Take 50 mg by mouth 2 times daily      niacin (SLO-NIACIN) 500 MG extended release tablet Take 500 mg by mouth nightly       No current facility-administered medications for this visit. Allergies   Allergen Reactions    Penicillins          History reviewed. No pertinent family history.       Social History     Socioeconomic History    Marital status:      Spouse name: Not on file    Number of children: Not on file    Years of education: Not on file    Highest education level: Not on file   Occupational History    Not on file   Social Needs    Financial resource strain: Not on file    Food insecurity     Worry: Not on file     Inability: Not on file    Transportation needs     Medical: Not on file     Non-medical: Not on file   Tobacco Use    Smoking status: Current Every Day Smoker     Packs/day: 1.50     Years: 40.00     Pack years: 60.00    Smokeless tobacco: Never Used   Substance and Sexual Activity    Alcohol use: Yes     Comment: 2-3 drinks daily     Drug use: Never  Sexual activity: Not on file   Lifestyle    Physical activity     Days per week: Not on file     Minutes per session: Not on file    Stress: Not on file   Relationships    Social connections     Talks on phone: Not on file     Gets together: Not on file     Attends Taoism service: Not on file     Active member of club or organization: Not on file     Attends meetings of clubs or organizations: Not on file     Relationship status: Not on file    Intimate partner violence     Fear of current or ex partner: Not on file     Emotionally abused: Not on file     Physically abused: Not on file     Forced sexual activity: Not on file   Other Topics Concern    Not on file   Social History Narrative    Not on file       Vitals:    03/04/21 1015   BP: 136/88   Pulse: 66   Resp: 16   SpO2: 98%         Wt Readings from Last 3 Encounters:   03/04/21 244 lb (110.7 kg)   08/27/20 238 lb 14.4 oz (108.4 kg)   05/21/20 235 lb (106.6 kg)         BP Readings from Last 3 Encounters:   03/04/21 136/88   08/27/20 (!) 148/90   05/21/20 138/76         Review of Systems   Constitutional: Negative for appetite change, chills, diaphoresis, fatigue, fever and unexpected weight change. HENT: Negative for congestion, dental problem, drooling, ear discharge, ear pain, facial swelling, hearing loss, mouth sores, nosebleeds, postnasal drip, sinus pressure, sore throat, tinnitus, trouble swallowing and voice change. Eyes: Negative for photophobia, pain, discharge, redness, itching and visual disturbance. Respiratory: Negative for apnea, cough, choking, chest tightness, shortness of breath and wheezing. Cardiovascular: Negative for chest pain, palpitations and leg swelling. Gastrointestinal: Negative for abdominal distention, abdominal pain, anorexia, blood in stool, change in bowel habit, constipation, diarrhea, nausea and vomiting. Endocrine: Negative for cold intolerance, heat intolerance, polydipsia, polyphagia and polyuria. Musculoskeletal: Positive for gait problem. Negative for arthralgias, back pain, joint swelling, myalgias, neck pain and neck stiffness. Skin: Negative for color change, pallor, rash and wound. Allergic/Immunologic: Negative for environmental allergies, food allergies and immunocompromised state. Neurological: Positive for dizziness, vertigo and light-headedness. Negative for tremors, seizures, syncope, facial asymmetry, speech difficulty, weakness, numbness and headaches. Hematological: Negative for adenopathy. Does not bruise/bleed easily. Psychiatric/Behavioral: Negative for agitation, behavioral problems, confusion, decreased concentration, dysphoric mood, hallucinations, self-injury, sleep disturbance and suicidal ideas. The patient is not nervous/anxious and is not hyperactive. OBJECTIVE:    Physical Exam  Constitutional:       Appearance: He is well-developed. HENT:      Head: Normocephalic and atraumatic. No raccoon eyes or Boudreaux's sign. Right Ear: External ear normal.      Left Ear: External ear normal.      Nose: Nose normal.   Eyes:      Conjunctiva/sclera: Conjunctivae normal.      Pupils: Pupils are equal, round, and reactive to light. Neck:      Musculoskeletal: Normal range of motion and neck supple. Normal range of motion. No neck rigidity or muscular tenderness. Thyroid: No thyroid mass or thyromegaly. Vascular: No carotid bruit. Trachea: No tracheal deviation. Meningeal: Brudzinski's sign and Kernig's sign absent. Cardiovascular:      Rate and Rhythm: Normal rate and regular rhythm. Pulmonary:      Effort: Pulmonary effort is normal.   Musculoskeletal:         General: No tenderness. Skin:     General: Skin is warm. Coloration: Skin is not pale. Findings: No erythema or rash. Nails: There is no clubbing.      Psychiatric: Attention and Perception: He is attentive. Mood and Affect: Mood is not anxious or depressed. Affect is not labile, blunt or inappropriate. Speech: He is communicative. Speech is not rapid and pressured, delayed, slurred or tangential.         Behavior: Behavior is not agitated, slowed, aggressive, withdrawn, hyperactive or combative. Behavior is cooperative. Thought Content: Thought content is not paranoid or delusional. Thought content does not include homicidal or suicidal ideation. Thought content does not include homicidal or suicidal plan. Cognition and Memory: Memory is not impaired. He does not exhibit impaired recent memory or impaired remote memory. Judgment: Judgment is not impulsive or inappropriate. NEUROLOGICALEXAMINATION :      A) MENTAL STATUS:                   Alert and  oriented  To time, place  And  Person. No Aphasia. No  Dysarthria. Able   To  Follow     SIMPLE   commands   without   Any  Difficulty. No right  To left confusion. Normal  Speech  And language function. Insight and  Judgment ,Fund  Of  Knowledge   within normal limits                Recent  And  Remote memory  within   normal limits                Attention &  Concentration are within   normal limits                                                 B) CRANIAL NERVES :      CN : Visual  Acuity  And  Visual fields  within normal limits               Fundi  Could  Not  Be  Could  Not  Be  Evaluated. 3,4,6 CN : Both  Pupils are   Reactive and  Equal.  Movements  Are  Intact. No  Nystagmus. No  THEODORE. No  Afferent  Pupillary  Defect noted. 5 CN :  Normal  Facial sensations and Corneal  Reflexes           7 CN:  Normal  Facial  Symmetry  And  Strength. No facial  Weakness.            8 CN :  Hearing  Appears within normal limits The right common carotid artery demonstrates peak systolic velocities of 82.9   and 47.6 cm/sec in the proximal and distal segments respectively.       The right internal carotid artery demonstrates the systolic velocities of   15.7, 36.6 and 40.9 cm/sec in the proximal, mid and distal segments   respectively.       The external carotid artery is patent.  The vertebral artery demonstrates   normal antegrade flow.       Mild atherosclerosis proximal ICA with associated 20% luminal narrowing.       ICA/CCA ratio of 0.6.           LEFT:       The left common carotid artery demonstrates peak systolic velocities of 50.3   and 52.6 cm/sec in the proximal and distal segments respectively.       The left internal carotid artery demonstrates the systolic velocities of   51.6, 50.8 and 58.5 cm/sec in the proximal, mid and distal segments   respectively.       The external carotid artery is patent.  The vertebral artery demonstrates   normal antegrade flow.       No evidence of focal atherosclerotic plaque.       ICA/CCA ratio of 0.7.           Impression   Mild atherosclerosis proximal right ICA with associated 20% luminal   narrowing, otherwise negative bilateral carotid ultrasound with no   hemodynamically significant stenosis. VISITING DIAGNOSIS:          ICD-10-CM    1. Cerebellar infarction ([address]  I63.9    2. Gait abnormality  R26.9    3. Weakness  R53.1    4. Cerebral ischemia  I67.82    5. Dizziness  R42    6. Essential hypertension  I10    7. Balance problem  R26.89    8. Alcohol abuse  F10.10    9. Smoker  F17.200    10. H/O fall  Z91.81    11. Visual symptoms  H57.9    12. Old MI (myocardial infarction)  I25.2    13.  Visual changes  H53.9               CONCERNS   &   INCREASED   RISK   FOR         * TIA,  CEREBRO  VASCULAR  ISCHEMIA,STROKE     *   DIZZINESS,   VERTEBROBASILAR  INSUFFICIENCY ,        *  GAIT  DIFFICULTIES  &   BALANCE PROBLMES   AND  FALL VARIOUS  RISK   FACTORS   WERE  REVIEWED   AND   DISCUSSED. *  PATIENT   HAS  MULTIPLE   MEDICAL, NEUROLOGICAL   PROBLEMS . PATIENT'S   MANAGEMENT  IS  CHALLENGING. PLAN:                         Mat Jerrell  Of  The  Diagnoses,  The  Management & Treatment  Options            AND    Care  plan  Were          Reviewed and   Discussed   With  patient. * Goals  And  Expectations  Of  The  Therapy  Discussed   And  Reviewed. *   Benefits   And   Side  Effect  Profile  Of  Medication/s   Were   Discussed                * Need   For  Further   Follow up For  The  Various  Problems Were  discussed. * Results  Of  The  Previous  Diagnostic tests were reviewed and  discussed                 Medical  Decision  Making  Was  Made  Based on the   Complexity  Of  Above  Mentioned  Diagnoses,    Data reviewed             And    Risk  Of  Significant   Co morbidities and   complicating   Factors. Medical  Decision  Was   High    Complexity   Due   To  The  Patient's  Multiple  Symptoms,      Advancing   Disease,  Complex  Treatment  Regimen,  Multiple medications           and   Risk  Of   Side  Effects,  Difficulty  In  Medication  Management  And  Diagnostic  Challenges       In  View  Of  The  Associated   Co  Morbid  Conditions   And  Problems. * FALL   PRECAUTIONS. THESE  REVIEWED   AND  DISCUSSED      *   ABSOLUTELY   NO  DRIVING      *   BE  CAREFUL  WITH  ACTIVITIES             *   ADEQUATE   FLUIDINTAKE   AND  ELECTROLYTE  BALANCE         * KEEP  DAIRY  OF   THE  NEUROLOGICAL  SYMPTOMS        RECORDING THE    DURATION  AND  FREQUENCY. *  AVOID    CONDITIONS  AND  FACTORS   THAT  MAKE                  NEUROLOGICAL  SYMPTOMS  WORSE.                  *TO  MAINTAIN  REGULAR  SLEEP  WAKE  CYCLES.      *   TO  HAVE  ADEQUATE  REST  AND   SLEEP    HOURS.            *    AVOID  ANY USAGE OF    TOBACCO, EXCESSIVE  ALCOHOL  AND   ILLEGAL   SUBSTANCES            *  CONTINUE   MEDICATIONS    PRESCRIBED   BY NEUROLOGIST    AS    RECOMMENDED       *   Compliance   With  Medications   And  Instructions          *    Antiplatelet  therapy    As   Recommended  Was   Discussed        *    Prophylactic  Use   Of     Vitamin   B   Complex,  Folic  Acid,    Vitamin  B12    Multivitamin,       Calcium  With  magnesium  And  Vit D    Supplementations   Over  The  Counter  Discussed                          *  EVALUATIONS  AND  FOLLOW UP:                 * PHYSICAL  THERAPY                                  *CARDIOLOGY              *   OPTHALMOLOGY                                                       Orders Placed This Encounter   Medications    clopidogrel (PLAVIX) 75 MG tablet     Sig: Take 1 tablet by mouth daily     Dispense:  90 tablet     Refill:  1                         *           PATIENT     STARTED   ON      PLAVIX    IN  April 2020                                PATIENT  TOLERATING  THE  SAME  AND  GETTING  BETTER                                                  EXPECTATIONS,   GOALS   AND  SIDE  EFFECTS  MEDICATION    WERE                                 REVIEWED     AND   DISCUSSED    IN    DETAIL. *         PATIENT'S   LEFT  SIDED   WEAKNESS  RESOLVED                           LEFT  SIDED  INCOORDINATION,   ATAXIA    BALANCE                               PROBLEMS       AND   DIPLOPIA                              -      IMPROVED     ABOUT     80  %                     *       PATIENT   RE  COUNSELED  TO                                    A)     AVOID   ANY    ALCOHOL  ABUSE     AND     SMOKING                                B)        TO  CONTINUE    PLAVIX   AS  BEFORE.                                     C)       RISK  FACTORS   FOR  STROKE     REVIEWED                                                   AND  DISCUSSED The   Above  Were  Reviewed  With  patient   and                       questions  Answered  In  Detail. More   Than  50% of face  To face Time   Was  Spent  On  Counseling                    And   Coordination  Of  Care   Of   Patient's  multiple   Neurological  Problems                         And   Comorbid  Medical   Conditions. Electronically signed by Mana Braga MD.,  [facility]      Board Certified in  Neurology &  In  Chris Hurtado [facility] of Psychiatry and Neurology (ABPN)      DISCLAIMER:   Although every effort was made to ensure the accuracy of this  electronictranscription, some errors in transcription may have occurred. GENERAL PATIENT INSTRUCTIONS:     ? A Healthy Lifestyle: Care Instructions  ? Your Care Instructions  ? A healthy lifestyle can help you feel good, stay at ahealthy weight, and have plenty of energy for both work and play. A healthy lifestyle is something you can share with your whole family. ? A healthy lifestyle also can lower your risk for serious health problems, such ashigh blood pressure, heart disease, and diabetes. ? You can follow a few steps listed below to improve your health and the health of your family. ? Follow-up careis a key part of your treatment and safety. Be sure to make and go to all appointments, and call your doctor if you are having problems. Its also a good idea to know your test results and keep a list of the medicines you take. ? How can you care for yourself at home? ? Do not eat too much sugar, fat, or fast foods. You can still have dessert and treats nowand then. The goal is moderation. ? Start small to improve your eating habits. Pay attention to portion sizes, drink less juice and soda pop, and eat more fruits and vegetables. ? Eat a healthy amount of food. A 3-ounce serving of meat, for example, is about the size of a deck of cards. Fill the rest of your plate with vegetables and whole grains. ? Limit theamount of soda and sports drinks you have every day. Drink more water when you are thirsty. ? Eat at least 5 servings of fruits and vegetables every day. It may seem like a lot, but it is not hard to reach this goal. Aserving or helping is 1 piece of fruit, 1 cup of vegetables, or 2 cups of leafy, raw vegetables. Have an apple or some carrot sticks as an afternoon snack instead of a candy bar. Try to have fruits and/or vegetables at everymeal.  ? Make exercise part of your daily routine. You may want to start with simple activities, such as walking, bicycling, or slow swimming. Try baldo active 30 to 60 minutes every day. You do not need to do all 30 to 60 minutes all at once. For example, you can exercise 3 times a day for 10 or 20 minutes. Moderate exercise is safe for most people, but it is always agood idea to talk to your doctor before starting an exercise program.  ? Keep moving. Krystian Pellet the lawn, work in the garden, or [place]. Take the stairs instead of the elevator at work. ? If you smoke, quit. Peoplewho smoke have an increased risk for heart attack, stroke, cancer, and other lung illnesses. Quitting is hard, but there are ways to boost your chance of quitting tobacco for good. ? Use nicotine gum, patches, or lozenges. ? Ask your doctor about stop-smoking programs and medicines. ? Keep trying. ? In addition to reducing your risk of diseases in the future, you will notice some benefits soon after you stop using tobacco. If you have shortness of breath or asthma symptoms, they will likely getbetter within a few weeks after you quit. ? Limit how much alcohol you drink. Moderate amounts of alcohol (up to 2 drinks a day for men, 1drink a day for women) are okay. But drinking too much can lead to liver problems, high blood pressure, and other health problems. ? health  ? If you have a family, there are many things you can do together to improve your health. ? Eat meals together as a family as often as possible. ? Eat healthy foods. This includes fruits, vegetables, lean meats and dairy, and whole grains. ? Include your family in your fitness plan. Most peoplethink of activities such as jogging or tennis as the way to fitness, but there are many ways you and your family can be more active. Anything that makes you breathe hard and gets your heart pumping is exercise. Here are sometips:  ? Walk to do errands or to take your child to school or the bus.  ? Go for a family bike ride after dinner instead of watching TV. ? Where can you learn more? ? Go [email or web address] and sign in to your [employer] account. Enter H782 in the Search HealthInformation box to learn more about \"A Healthy Lifestyle: Care Instructions. \"     If you do not have anaccount, please click on the \"Sign Up Now\" link. ? Current as of: July 26, 2016  ? Content Version: 11.2  ? © 2863-3782 [employer]. Care instructions adapted under license by [facility] ([facility]). If you have questions about a medical condition or this instruction, always ask your healthcare professional. [employer] disclaims any warranty or liability for your use of this information.

## 2021-03-08 ENCOUNTER — TELEPHONE (OUTPATIENT)
Dept: NEUROLOGY | Age: 56
End: 2021-03-08